# Patient Record
Sex: MALE | Race: WHITE | Employment: OTHER | URBAN - METROPOLITAN AREA
[De-identification: names, ages, dates, MRNs, and addresses within clinical notes are randomized per-mention and may not be internally consistent; named-entity substitution may affect disease eponyms.]

---

## 2022-12-01 ENCOUNTER — HOSPITAL ENCOUNTER (EMERGENCY)
Age: 67
Discharge: HOME | End: 2022-12-01
Attending: EMERGENCY MEDICINE
Payer: MEDICARE

## 2022-12-01 ENCOUNTER — APPOINTMENT (OUTPATIENT)
Dept: GENERAL RADIOLOGY | Age: 67
End: 2022-12-01
Payer: MEDICARE

## 2022-12-01 VITALS
DIASTOLIC BLOOD PRESSURE: 94 MMHG | TEMPERATURE: 97.7 F | SYSTOLIC BLOOD PRESSURE: 144 MMHG | OXYGEN SATURATION: 93 % | HEART RATE: 70 BPM | RESPIRATION RATE: 16 BRPM

## 2022-12-01 DIAGNOSIS — R05.9 COUGH, UNSPECIFIED TYPE: ICD-10-CM

## 2022-12-01 DIAGNOSIS — J06.9 UPPER RESPIRATORY TRACT INFECTION, UNSPECIFIED TYPE: ICD-10-CM

## 2022-12-01 DIAGNOSIS — J10.1 INFLUENZA A: Primary | ICD-10-CM

## 2022-12-01 DIAGNOSIS — R68.89 FLU-LIKE SYMPTOMS: ICD-10-CM

## 2022-12-01 DIAGNOSIS — J18.9 PNEUMONIA DUE TO INFECTIOUS ORGANISM, UNSPECIFIED LATERALITY, UNSPECIFIED PART OF LUNG: ICD-10-CM

## 2022-12-01 LAB
RAPID INFLUENZA  B AGN: NEGATIVE
RAPID INFLUENZA A AGN: POSITIVE
SARS-COV-2, NAAT: NOT DETECTED
SOURCE: NORMAL

## 2022-12-01 PROCEDURE — 87804 INFLUENZA ASSAY W/OPTIC: CPT

## 2022-12-01 PROCEDURE — 87635 SARS-COV-2 COVID-19 AMP PRB: CPT

## 2022-12-01 PROCEDURE — 6370000000 HC RX 637 (ALT 250 FOR IP): Performed by: EMERGENCY MEDICINE

## 2022-12-01 PROCEDURE — 71045 X-RAY EXAM CHEST 1 VIEW: CPT

## 2022-12-01 PROCEDURE — 99284 EMERGENCY DEPT VISIT MOD MDM: CPT

## 2022-12-01 RX ORDER — ASPIRIN 81 MG/1
81 TABLET ORAL WEEKLY
COMMUNITY

## 2022-12-01 RX ORDER — AZITHROMYCIN 250 MG/1
TABLET, FILM COATED ORAL
Qty: 1 PACKET | Refills: 0 | Status: SHIPPED | OUTPATIENT
Start: 2022-12-01 | End: 2022-12-11

## 2022-12-01 RX ORDER — NAPROXEN 500 MG/1
500 TABLET ORAL 2 TIMES DAILY
Qty: 60 TABLET | Refills: 0 | Status: SHIPPED | OUTPATIENT
Start: 2022-12-01

## 2022-12-01 RX ORDER — TAMSULOSIN HYDROCHLORIDE 0.4 MG/1
CAPSULE ORAL
COMMUNITY
Start: 2022-09-15

## 2022-12-01 RX ORDER — ACETAMINOPHEN 325 MG/1
650 TABLET ORAL EVERY 6 HOURS PRN
Qty: 120 TABLET | Refills: 3 | Status: SHIPPED | OUTPATIENT
Start: 2022-12-01

## 2022-12-01 RX ORDER — ALBUTEROL SULFATE 90 UG/1
2 AEROSOL, METERED RESPIRATORY (INHALATION) ONCE
Status: COMPLETED | OUTPATIENT
Start: 2022-12-01 | End: 2022-12-01

## 2022-12-01 RX ORDER — GUAIFENESIN/DEXTROMETHORPHAN 100-10MG/5
5 SYRUP ORAL 4 TIMES DAILY PRN
Qty: 120 ML | Refills: 0 | Status: SHIPPED | OUTPATIENT
Start: 2022-12-01 | End: 2022-12-11

## 2022-12-01 RX ORDER — BENZONATATE 100 MG/1
100 CAPSULE ORAL 3 TIMES DAILY PRN
Qty: 10 CAPSULE | Refills: 0 | Status: SHIPPED | OUTPATIENT
Start: 2022-12-01 | End: 2022-12-08

## 2022-12-01 RX ORDER — BENZONATATE 100 MG/1
100 CAPSULE ORAL ONCE
Status: COMPLETED | OUTPATIENT
Start: 2022-12-01 | End: 2022-12-01

## 2022-12-01 RX ORDER — FINASTERIDE 5 MG/1
TABLET, FILM COATED ORAL
COMMUNITY
Start: 2022-10-12

## 2022-12-01 RX ORDER — AZILSARTAN KAMEDOXOMIL AND CHLORTHALIDONE 40; 25 MG/1; MG/1
TABLET ORAL
COMMUNITY
Start: 2022-10-23

## 2022-12-01 RX ORDER — NEBIVOLOL 20 MG/1
TABLET ORAL
COMMUNITY
Start: 2022-09-27

## 2022-12-01 RX ORDER — ESOMEPRAZOLE MAGNESIUM 40 MG/1
CAPSULE, DELAYED RELEASE ORAL
COMMUNITY
Start: 2022-10-05

## 2022-12-01 RX ORDER — SILDENAFIL 100 MG/1
100 TABLET, FILM COATED ORAL PRN
COMMUNITY
Start: 2013-03-20

## 2022-12-01 RX ORDER — LEUCOVORIN/PYRIDOX/MECOBALAMIN 4-50-2 MG
TABLET ORAL
COMMUNITY
Start: 2022-10-04

## 2022-12-01 RX ORDER — PANTOPRAZOLE SODIUM 40 MG/1
TABLET, DELAYED RELEASE ORAL
COMMUNITY
Start: 2022-10-23

## 2022-12-01 RX ORDER — ALBUTEROL SULFATE 90 UG/1
2 AEROSOL, METERED RESPIRATORY (INHALATION) EVERY 4 HOURS PRN
Qty: 18 G | Refills: 1 | Status: SHIPPED | OUTPATIENT
Start: 2022-12-01 | End: 2022-12-31

## 2022-12-01 RX ADMIN — GUAIFENESIN 200 MG: 200 SOLUTION ORAL at 13:36

## 2022-12-01 RX ADMIN — BENZONATATE 100 MG: 100 CAPSULE ORAL at 13:36

## 2022-12-01 RX ADMIN — ALBUTEROL SULFATE 2 PUFF: 90 AEROSOL, METERED RESPIRATORY (INHALATION) at 13:37

## 2022-12-01 ASSESSMENT — ENCOUNTER SYMPTOMS
WHEEZING: 1
SHORTNESS OF BREATH: 1
DIARRHEA: 1
COUGH: 1
EYES NEGATIVE: 1
ALLERGIC/IMMUNOLOGIC NEGATIVE: 1

## 2022-12-01 ASSESSMENT — PAIN - FUNCTIONAL ASSESSMENT: PAIN_FUNCTIONAL_ASSESSMENT: NONE - DENIES PAIN

## 2022-12-01 NOTE — ED PROVIDER NOTES
[address] [facility] [place]      TRIAGE CHIEF COMPLAINT:   Cough (Cough and congestion, chills since Sunday. Pt ambulated to bed per self, AOx4, breathing unlabored, skin warm and dry. Pt taking OTC meds for symptoms. Pt also reports loose stools at time. )      [place]:  Kymberly Hernandez is a 69 y.o. male that presents with complaint of cough flulike symptoms since Sunday. Patient states he was around people for Thanksgiving and ever since then has gotten sick cough congestion rhinorrhea body aches chills malaise fatigue subjective fevers. No headache just has chest pain when coughing also has diarrhea. Denies any urine complaints no other questions or concerns. Beth Balling REVIEW OF SYSTEMS:  At least 10 systems reviewed and otherwise acutely negative except as in the [address] Review of Systems   Constitutional:  Positive for chills. HENT:  Positive for congestion. Eyes: Negative. Respiratory:  Positive for cough, shortness of breath and wheezing. Cardiovascular: Negative. Gastrointestinal:  Positive for diarrhea. Endocrine: Negative. Genitourinary: Negative. Musculoskeletal:  Positive for arthralgias and myalgias. Skin: Negative. Allergic/Immunologic: Negative. Neurological: Negative. Hematological: Negative. Psychiatric/Behavioral: Negative. All other systems reviewed and are negative. Past Medical History:   Diagnosis Date    Diabetes mellitus ([address]     Hypertension      Past Surgical History:   Procedure Laterality Date    BACK SURGERY      NECK MASS EXCISION      SKIN CANCER EXCISION       History reviewed. No pertinent family history.   Social History     Socioeconomic History    Marital status:      Spouse name: Not on file    Number of children: Not on file    Years of education: Not on file    Highest education level: Not on file   Occupational History    Not on file   Tobacco Use    Smoking status: Not on file    Smokeless tobacco: Not on file   Vaping Use    Vaping Use: Never used   Substance and Sexual Activity    Alcohol use: Yes    Drug use: Never    Sexual activity: Not on file   Other Topics Concern    Not on file   Social History Narrative    Not on file     Social Determinants of Health     Financial Resource Strain: Not on file   Food Insecurity: Not on file   Transportation Needs: Not on file   Physical Activity: Not on file   Stress: Not on file   Social Connections: Not on file   Intimate Partner Violence: Not on file   Housing Stability: Not on file     No current facility-administered medications for this encounter. Current Outpatient Medications   Medication Sig Dispense Refill    sildenafil (VIAGRA) 100 MG tablet Take 100 mg by mouth as needed      guaiFENesin-dextromethorphan (ROBITUSSIN DM) 100-10 MG/5ML syrup Take 5 mLs by mouth 4 times daily as needed for Cough 120 mL 0    benzonatate (TESSALON PERLES) 100 MG capsule Take 1 capsule by mouth 3 times daily as needed for Cough 10 capsule 0    albuterol sulfate HFA (PROVENTIL HFA) 108 (90 Base) MCG/ACT inhaler Inhale 2 puffs into the lungs every 4 hours as needed for Wheezing or Shortness of Breath With spacer (and mask if indicated). Thanks.  18 g 1    naproxen (NAPROSYN) 500 MG tablet Take 1 tablet by mouth 2 times daily 60 tablet 0    acetaminophen (TYLENOL) 325 MG tablet Take 2 tablets by mouth every 6 hours as needed for Pain 120 tablet 3    azithromycin (ZITHROMAX) 250 MG tablet Take 2 tablets (500 mg) on Day 1, followed by 1 tablet (250 mg) once daily on Days 2 through 5. 1 packet 0    aspirin 81 MG EC tablet Take 81 mg by mouth once a week      EDARBYCLOR 40-25 MG TABS take 1 tablet by mouth every morning      esomeprazole (NEXIUM) 40 MG delayed release capsule take 1 tablet by mouth once daily      finasteride (PROSCAR) 5 MG tablet take 1 tablet by mouth once daily      FOLINIC-PLUS 4-50-2 MG TABS take 1 tablet by mouth once daily      metFORMIN (GLUCOPHAGE) 850 MG tablet take 1 tablet by mouth twice a day      nebivolol (BYSTOLIC) 20 MG TABS tablet take 1 tablet by mouth once daily      pantoprazole (PROTONIX) 40 MG tablet take 1 tablet by mouth every evening      tamsulosin (FLOMAX) 0.4 MG capsule take 1 capsule by mouth at bedtime        No Known Allergies  No current facility-administered medications for this encounter. Current Outpatient Medications   Medication Sig Dispense Refill    sildenafil (VIAGRA) 100 MG tablet Take 100 mg by mouth as needed      guaiFENesin-dextromethorphan (ROBITUSSIN DM) 100-10 MG/5ML syrup Take 5 mLs by mouth 4 times daily as needed for Cough 120 mL 0    benzonatate (TESSALON PERLES) 100 MG capsule Take 1 capsule by mouth 3 times daily as needed for Cough 10 capsule 0    albuterol sulfate HFA (PROVENTIL HFA) 108 (90 Base) MCG/ACT inhaler Inhale 2 puffs into the lungs every 4 hours as needed for Wheezing or Shortness of Breath With spacer (and mask if indicated). Thanks.  18 g 1    naproxen (NAPROSYN) 500 MG tablet Take 1 tablet by mouth 2 times daily 60 tablet 0    acetaminophen (TYLENOL) 325 MG tablet Take 2 tablets by mouth every 6 hours as needed for Pain 120 tablet 3    azithromycin (ZITHROMAX) 250 MG tablet Take 2 tablets (500 mg) on Day 1, followed by 1 tablet (250 mg) once daily on Days 2 through 5. 1 packet 0    aspirin 81 MG EC tablet Take 81 mg by mouth once a week      EDARBYCLOR 40-25 MG TABS take 1 tablet by mouth every morning      esomeprazole (NEXIUM) 40 MG delayed release capsule take 1 tablet by mouth once daily      finasteride (PROSCAR) 5 MG tablet take 1 tablet by mouth once daily      FOLINIC-PLUS 4-50-2 MG TABS take 1 tablet by mouth once daily      metFORMIN (GLUCOPHAGE) 850 MG tablet take 1 tablet by mouth twice a day      nebivolol (BYSTOLIC) 20 MG TABS tablet take 1 tablet by mouth once daily      pantoprazole (PROTONIX) 40 MG tablet take 1 tablet by mouth every evening      tamsulosin (FLOMAX) 0.4 MG capsule take 1 capsule by mouth at bedtime         Nursing Notes Reviewed    VITAL SIGNS:  ED Triage Vitals   Enc Vitals Group      BP       Pulse       Resp       Temp       Temp src       SpO2       Weight       Height       Head Circumference       Peak Flow       Pain Score       Pain Loc       Pain Edu? Excl. in [address]? PHYSICAL EXAM:  Physical Exam  Vitals and nursing note reviewed. Constitutional:       General: He is not in acute distress. Appearance: Normal appearance. He is not ill-appearing, toxic-appearing or diaphoretic. HENT:      Head: Normocephalic and atraumatic. Right Ear: External ear normal.      Left Ear: External ear normal.      Nose: Congestion present. Mouth/Throat:      Mouth: Mucous membranes are moist.      Pharynx: Oropharynx is clear. No oropharyngeal exudate or posterior oropharyngeal erythema. Eyes:      General: No scleral icterus. Right eye: No discharge. Left eye: No discharge. Extraocular Movements: Extraocular movements intact. Conjunctiva/sclera: Conjunctivae normal.   Cardiovascular:      Rate and Rhythm: Normal rate and regular rhythm. Pulses: Normal pulses. Heart sounds: Normal heart sounds. Pulmonary:      Effort: Pulmonary effort is normal. No respiratory distress. Breath sounds: No stridor. Wheezing and rhonchi present. No rales. Chest:      Chest wall: Tenderness present. Abdominal:      General: Bowel sounds are normal. There is no distension. Palpations: Abdomen is soft. There is no mass. Tenderness: There is no abdominal tenderness. There is no guarding or rebound. Hernia: No hernia is present. Musculoskeletal:         General: Tenderness present. No swelling, deformity or signs of injury. Normal range of motion. Cervical back: Normal range of motion. No rigidity. Right lower leg: No edema. Left lower leg: No edema. Skin:     General: Skin is warm. Coloration: Skin is not jaundiced or pale. Findings: No bruising, erythema, lesion or rash. Neurological:      General: No focal deficit present. Mental Status: He is alert and oriented to person, place, and time. Cranial Nerves: No cranial nerve deficit. Sensory: No sensory deficit. Motor: No weakness. Coordination: Coordination normal.   Psychiatric:         Mood and Affect: Mood normal.         Behavior: Behavior normal.         Thought Content: Thought content normal.         Judgment: Judgment normal.         I have reviewed andinterpreted all of the currently available lab results from this visit (if applicable):    Results for orders placed or performed during the hospital encounter of 12/01/22   Rapid Flu Swab    Specimen: Nasopharyngeal   Result Value Ref Range    Rapid Influenza A Ag POSITIVE (A) NEGATIVE    Rapid Influenza B Ag NEGATIVE NEGATIVE   COVID-19, Rapid    Specimen: Nasopharyngeal   Result Value Ref Range    Source NARES     SARS-CoV-2, NAAT NOT DETECTED NOT DETECTED        Radiographs (if obtained):  [] The following radiograph was interpreted by myself in the absence of a radiologist:  [x] Radiologist's Report Reviewed:    Chest x-ray    EKG (if obtained): (All EKG's are interpreted by myself in the absence of a cardiologist)    MDM:    Patient here with flulike symptoms. Again patient's been sick since Sunday. He was around people Thanksgiving now he is got cough congestion fatigue malaise body ache subjective fevers and chills congestion. Chest pain with coughing and nausea diarrhea. On exam he is coughing has some wheezing and rhonchi. Otherwise he is no distress vital signs are stable. I will give him cough medicine, inhaler, do a chest x-ray rapid flu, COVID. He tested positive for influenza. Has been vaccinated for COVID. Patient is outside window for Tamiflu.   Will discharge home with supportive care medications and again symptoms viral, influenza nature I do not think he needs additional labs or imaging at all think he needs admission for EKG he appears otherwise nontoxic nonseptic no distress. Patient recheck doing well. Tested positive for the flu. X-ray shows possible infiltrate versus atelectasis. Cough has been productive we will give him a Z-Jagjit in case is bacterial otherwise likely viral, flu. Patient stable discharge home given return precautions and follow-up information. Stable. CLINICAL IMPRESSION:  Final diagnoses:   Upper respiratory tract infection, unspecified type   Flu-like symptoms   Cough, unspecified type   Influenza A   Pneumonia due to infectious organism, unspecified laterality, unspecified part of lung       (Please note that portions of this note may have been completed with a voice recognition program. Efforts were made to edit the dictations but occasionally words aremis-transcribed.)    DISPOSITION REFERRAL (if applicable):  [address]  [phone]    If symptoms worsen    [place], [place]  [address]  [phone]    Schedule an appointment as soon as possible for a visit in 1 day  Family doctor    DISPOSITION MEDICATIONS (if applicable):  New Prescriptions    ACETAMINOPHEN (TYLENOL) 325 MG TABLET    Take 2 tablets by mouth every 6 hours as needed for Pain    ALBUTEROL SULFATE HFA (PROVENTIL HFA) 108 (90 BASE) MCG/ACT INHALER    Inhale 2 puffs into the lungs every 4 hours as needed for Wheezing or Shortness of Breath With spacer (and mask if indicated). Thanks. AZITHROMYCIN (ZITHROMAX) 250 MG TABLET    Take 2 tablets (500 mg) on Day 1, followed by 1 tablet (250 mg) once daily on Days 2 through 5.     BENZONATATE (TESSALON PERLES) 100 MG CAPSULE    Take 1 capsule by mouth 3 times daily as needed for Cough    GUAIFENESIN-DEXTROMETHORPHAN (ROBITUSSIN DM) 100-10 MG/5ML SYRUP    Take 5 mLs by mouth 4 times daily as needed for Cough    NAPROXEN (NAPROSYN) 500 MG TABLET    Take 1 tablet by mouth 2 times daily          Mercy Elise, DO Mercy Elise DO  12/01/22 [id number]

## 2022-12-01 NOTE — Clinical Note
Iza Engel was seen and treated in our emergency department on 12/1/2022. He may return to work on 12/05/2022. If you have any questions or concerns, please don't hesitate to call.       Mercy Cones, DO

## 2023-01-12 ENCOUNTER — APPOINTMENT (OUTPATIENT)
Dept: CT IMAGING | Age: 68
End: 2023-01-12
Payer: MEDICARE

## 2023-01-12 ENCOUNTER — HOSPITAL ENCOUNTER (EMERGENCY)
Age: 68
Discharge: ANOTHER HOSPITAL | End: 2023-01-13
Attending: EMERGENCY MEDICINE
Payer: MEDICARE

## 2023-01-12 ENCOUNTER — APPOINTMENT (OUTPATIENT)
Dept: GENERAL RADIOLOGY | Age: 68
End: 2023-01-12
Payer: MEDICARE

## 2023-01-12 DIAGNOSIS — R55 SYNCOPE, UNSPECIFIED SYNCOPE TYPE: Primary | ICD-10-CM

## 2023-01-12 DIAGNOSIS — I61.9 INTRAPARENCHYMAL HEMORRHAGE OF BRAIN (HCC): ICD-10-CM

## 2023-01-12 DIAGNOSIS — S06.5XAA SUBDURAL HEMATOMA: ICD-10-CM

## 2023-01-12 DIAGNOSIS — E87.1 HYPONATREMIA: ICD-10-CM

## 2023-01-12 LAB
ALBUMIN SERPL-MCNC: 4.1 GM/DL (ref 3.4–5)
ALCOHOL SCREEN SERUM: 0.05 %WT/VOL
ALP BLD-CCNC: 49 IU/L (ref 40–129)
ALT SERPL-CCNC: 31 U/L (ref 10–40)
ANION GAP SERPL CALCULATED.3IONS-SCNC: 15 MMOL/L (ref 4–16)
AST SERPL-CCNC: 23 IU/L (ref 15–37)
BASOPHILS ABSOLUTE: 0 K/CU MM
BASOPHILS RELATIVE PERCENT: 0.1 % (ref 0–1)
BILIRUB SERPL-MCNC: 0.4 MG/DL (ref 0–1)
BILIRUBIN URINE: NEGATIVE MG/DL
BLOOD, URINE: NEGATIVE
BUN BLDV-MCNC: 16 MG/DL (ref 6–23)
CALCIUM SERPL-MCNC: 8.6 MG/DL (ref 8.3–10.6)
CHLORIDE BLD-SCNC: 85 MMOL/L (ref 99–110)
CLARITY: CLEAR
CO2: 23 MMOL/L (ref 21–32)
COLOR: YELLOW
COMMENT UA: NORMAL
CREAT SERPL-MCNC: 0.9 MG/DL (ref 0.9–1.3)
DIFFERENTIAL TYPE: ABNORMAL
EOSINOPHILS ABSOLUTE: 0 K/CU MM
EOSINOPHILS RELATIVE PERCENT: 0.2 % (ref 0–3)
GFR SERPL CREATININE-BSD FRML MDRD: >60 ML/MIN/1.73M2
GLUCOSE BLD-MCNC: 155 MG/DL (ref 70–99)
GLUCOSE BLD-MCNC: 157 MG/DL (ref 70–99)
GLUCOSE, URINE: NEGATIVE MG/DL
HCT VFR BLD CALC: 37 % (ref 42–52)
HEMOGLOBIN: 13.1 GM/DL (ref 13.5–18)
IMMATURE NEUTROPHIL %: 0.5 % (ref 0–0.43)
KETONES, URINE: NEGATIVE MG/DL
LEUKOCYTE ESTERASE, URINE: NEGATIVE
LYMPHOCYTES ABSOLUTE: 1.5 K/CU MM
LYMPHOCYTES RELATIVE PERCENT: 17.2 % (ref 24–44)
MCH RBC QN AUTO: 32.8 PG (ref 27–31)
MCHC RBC AUTO-ENTMCNC: 35.4 % (ref 32–36)
MCV RBC AUTO: 92.5 FL (ref 78–100)
MONOCYTES ABSOLUTE: 0.6 K/CU MM
MONOCYTES RELATIVE PERCENT: 6.9 % (ref 0–4)
NITRITE URINE, QUANTITATIVE: NEGATIVE
PDW BLD-RTO: 11.9 % (ref 11.7–14.9)
PH, URINE: 5.5 (ref 5–8)
PLATELET # BLD: 272 K/CU MM (ref 140–440)
PMV BLD AUTO: 9.6 FL (ref 7.5–11.1)
POTASSIUM SERPL-SCNC: 3.7 MMOL/L (ref 3.5–5.1)
PROTEIN UA: NEGATIVE MG/DL
RBC # BLD: 4 M/CU MM (ref 4.6–6.2)
SEGMENTED NEUTROPHILS ABSOLUTE COUNT: 6.6 K/CU MM
SEGMENTED NEUTROPHILS RELATIVE PERCENT: 75.1 % (ref 36–66)
SODIUM BLD-SCNC: 123 MMOL/L (ref 135–145)
SPECIFIC GRAVITY UA: 1.01 (ref 1–1.03)
TOTAL IMMATURE NEUTOROPHIL: 0.04 K/CU MM
TOTAL PROTEIN: 7 GM/DL (ref 6.4–8.2)
TROPONIN T: <0.01 NG/ML
UROBILINOGEN, URINE: 0.2 MG/DL (ref 0.2–1)
WBC # BLD: 8.7 K/CU MM (ref 4–10.5)

## 2023-01-12 PROCEDURE — 84300 ASSAY OF URINE SODIUM: CPT

## 2023-01-12 PROCEDURE — 82962 GLUCOSE BLOOD TEST: CPT

## 2023-01-12 PROCEDURE — 83935 ASSAY OF URINE OSMOLALITY: CPT

## 2023-01-12 PROCEDURE — 96360 HYDRATION IV INFUSION INIT: CPT

## 2023-01-12 PROCEDURE — G0480 DRUG TEST DEF 1-7 CLASSES: HCPCS

## 2023-01-12 PROCEDURE — 72125 CT NECK SPINE W/O DYE: CPT

## 2023-01-12 PROCEDURE — 85025 COMPLETE CBC W/AUTO DIFF WBC: CPT

## 2023-01-12 PROCEDURE — 70450 CT HEAD/BRAIN W/O DYE: CPT

## 2023-01-12 PROCEDURE — 80053 COMPREHEN METABOLIC PANEL: CPT

## 2023-01-12 PROCEDURE — 71045 X-RAY EXAM CHEST 1 VIEW: CPT

## 2023-01-12 PROCEDURE — 84484 ASSAY OF TROPONIN QUANT: CPT

## 2023-01-12 PROCEDURE — 85610 PROTHROMBIN TIME: CPT

## 2023-01-12 PROCEDURE — 99285 EMERGENCY DEPT VISIT HI MDM: CPT

## 2023-01-12 PROCEDURE — 82570 ASSAY OF URINE CREATININE: CPT

## 2023-01-12 PROCEDURE — 93005 ELECTROCARDIOGRAM TRACING: CPT | Performed by: EMERGENCY MEDICINE

## 2023-01-12 PROCEDURE — 81003 URINALYSIS AUTO W/O SCOPE: CPT

## 2023-01-12 ASSESSMENT — PAIN - FUNCTIONAL ASSESSMENT
PAIN_FUNCTIONAL_ASSESSMENT: NONE - DENIES PAIN
PAIN_FUNCTIONAL_ASSESSMENT: NONE - DENIES PAIN

## 2023-01-13 VITALS
SYSTOLIC BLOOD PRESSURE: 108 MMHG | OXYGEN SATURATION: 98 % | TEMPERATURE: 98.2 F | WEIGHT: 211 LBS | HEART RATE: 72 BPM | HEIGHT: 76 IN | RESPIRATION RATE: 20 BRPM | DIASTOLIC BLOOD PRESSURE: 57 MMHG | BODY MASS INDEX: 25.69 KG/M2

## 2023-01-13 LAB
CREATININE URINE: 36.8 MG/DL (ref 39–259)
EKG ATRIAL RATE: 75 BPM
EKG DIAGNOSIS: NORMAL
EKG P AXIS: 36 DEGREES
EKG P-R INTERVAL: 152 MS
EKG Q-T INTERVAL: 374 MS
EKG QRS DURATION: 102 MS
EKG QTC CALCULATION (BAZETT): 417 MS
EKG R AXIS: 12 DEGREES
EKG T AXIS: 49 DEGREES
EKG VENTRICULAR RATE: 75 BPM
INR BLD: 0.92 INDEX
OSMOLALITY URINE: 293 MOS/L (ref 292–1090)
PROTHROMBIN TIME: 12.1 SECONDS (ref 11.7–14.5)
SODIUM URINE: 54 MMOL/L (ref 35–167)

## 2023-01-13 PROCEDURE — 2580000003 HC RX 258: Performed by: EMERGENCY MEDICINE

## 2023-01-13 PROCEDURE — 96360 HYDRATION IV INFUSION INIT: CPT

## 2023-01-13 RX ORDER — SODIUM CHLORIDE 9 MG/ML
INJECTION, SOLUTION INTRAVENOUS CONTINUOUS
Status: DISCONTINUED | OUTPATIENT
Start: 2023-01-13 | End: 2023-01-13 | Stop reason: HOSPADM

## 2023-01-13 RX ADMIN — SODIUM CHLORIDE: 9 INJECTION, SOLUTION INTRAVENOUS at 00:21

## 2023-01-13 ASSESSMENT — PAIN - FUNCTIONAL ASSESSMENT
PAIN_FUNCTIONAL_ASSESSMENT: NONE - DENIES PAIN
PAIN_FUNCTIONAL_ASSESSMENT: NONE - DENIES PAIN

## 2023-01-13 NOTE — ED PROVIDER NOTES
[address] [facility] ENCOUNTER      Pt Name: Juan Alberto Lucero  MRN: [id number]  Loiuegfarmida 1955  Date of evaluation: 1/12/2023  Provider: Matthew Munson MD    CHIEF COMPLAINT       Chief Complaint   Patient presents with    Loss of Consciousness     Pt wife reports pt stood to get out of bed and had a sudden loss of consciousness and fell hitting his head. Pt is A&O x4 and denies any pain at this time. HISTORY OF PRESENT ILLNESS      Juan Alberto Lucero is a 69 y.o. male who presents to the emergency department  for   Chief Complaint   Patient presents with    Loss of Consciousness     Pt wife reports pt stood to get out of bed and had a sudden loss of consciousness and fell hitting his head. Pt is A&O x4 and denies any pain at this time. 68-year male with history of CAD, DM, hypertension presents after syncopal fall at home. He presents with his wife who provides collateral information. He reportedly was sitting down watching television and his wife believes he got up to take a cup to the kitchen when she heard him fall. She reports finding him \"passed out. \"  He did take a couple of minutes to return to his baseline. He has ambulated since the fall. He is amnestic about the fall. His wife denies that he had any convulsions that she witnessed. She does endorse that he has a remote history of passing out. She reports that his prior episodes of passing out were due to blood sugar issues. She reports that she did take his blood sugar after the episode and it was in the 130s. She reports she initially had a hard time getting a blood pressure reading on him. He comes the emergency department for evaluation. He is now at his neurological baseline. Presents with a GCS of 15. He is amatory without difficulty. He denies any pain complaints. He denies any chest pain or abdominal pain. Denies any palpitations. He is not anticoagulated.   His wife reports that he did strike his head when he fell. He does report that he is getting over COVID-19 infection. He was on multiple medications including steroids and some antibiotics for Covid-19. His wife reports that he did test negative for Covid-19 today. [place] He reports drinking 5 to 6 beers a day. Nursing Notes, Triage Notes & Vital Signs were reviewed. REVIEW OF SYSTEMS    (2-9 systems for level 4, 10 or more for level 5)     Review of Systems   Neurological:  Positive for syncope. Except as noted above the remainder of the review of systems was reviewed and negative. PAST MEDICAL HISTORY     Past Medical History:   Diagnosis Date    Diabetes mellitus ([facility] [address]     Hypertension        Prior to Admission medications    Medication Sig Start Date End Date Taking?  Authorizing Provider   aspirin 81 MG EC tablet Take 81 mg by mouth once a week    Historical Provider, MD   EDARBYCLOR 40-25 MG TABS take 1 tablet by mouth every morning 10/23/22   Historical Provider, MD   esomeprazole (NEXIUM) 40 MG delayed release capsule take 1 tablet by mouth once daily 10/5/22   Historical Provider, MD   finasteride (PROSCAR) 5 MG tablet take 1 tablet by mouth once daily 10/12/22   Historical Provider, MD   FOLINIC-PLUS 4-50-2 MG TABS take 1 tablet by mouth once daily 10/4/22   Historical Provider, MD   metFORMIN (GLUCOPHAGE) 850 MG tablet take 1 tablet by mouth twice a day 10/23/22   Historical Provider, MD   nebivolol (BYSTOLIC) 20 MG TABS tablet take 1 tablet by mouth once daily 9/27/22   Historical Provider, MD   pantoprazole (PROTONIX) 40 MG tablet take 1 tablet by mouth every evening 10/23/22   Historical Provider, MD   sildenafil (VIAGRA) 100 MG tablet Take 100 mg by mouth as needed 3/20/13   Historical Provider, MD   tamsulosin (FLOMAX) 0.4 MG capsule take 1 capsule by mouth at bedtime 9/15/22   Historical Provider, MD   albuterol sulfate HFA (PROVENTIL HFA) 108 (90 Base) MCG/ACT inhaler Inhale 2 puffs into the lungs every 4 hours as needed for Wheezing or Shortness of Breath With spacer (and mask if indicated). Thanks. 12/1/22 12/31/22  Nae Allen, DO   naproxen (NAPROSYN) 500 MG tablet Take 1 tablet by mouth 2 times daily 12/1/22   Nae Allen, DO   acetaminophen (TYLENOL) 325 MG tablet Take 2 tablets by mouth every 6 hours as needed for Pain 12/1/22   Nae Allen, DO        There is no problem list on file for this patient. SURGICAL HISTORY       Past Surgical History:   Procedure Laterality Date    BACK SURGERY      NECK MASS EXCISION      SKIN CANCER EXCISION           CURRENT MEDICATIONS       Previous Medications    ACETAMINOPHEN (TYLENOL) 325 MG TABLET    Take 2 tablets by mouth every 6 hours as needed for Pain    ALBUTEROL SULFATE HFA (PROVENTIL HFA) 108 (90 BASE) MCG/ACT INHALER    Inhale 2 puffs into the lungs every 4 hours as needed for Wheezing or Shortness of Breath With spacer (and mask if indicated). Thanks. ASPIRIN 81 MG EC TABLET    Take 81 mg by mouth once a week    EDARBYCLOR 40-25 MG TABS    take 1 tablet by mouth every morning    ESOMEPRAZOLE (NEXIUM) 40 MG DELAYED RELEASE CAPSULE    take 1 tablet by mouth once daily    FINASTERIDE (PROSCAR) 5 MG TABLET    take 1 tablet by mouth once daily    FOLINIC-PLUS 4-50-2 MG TABS    take 1 tablet by mouth once daily    METFORMIN (GLUCOPHAGE) 850 MG TABLET    take 1 tablet by mouth twice a day    NAPROXEN (NAPROSYN) 500 MG TABLET    Take 1 tablet by mouth 2 times daily    NEBIVOLOL (BYSTOLIC) 20 MG TABS TABLET    take 1 tablet by mouth once daily    PANTOPRAZOLE (PROTONIX) 40 MG TABLET    take 1 tablet by mouth every evening    SILDENAFIL (VIAGRA) 100 MG TABLET    Take 100 mg by mouth as needed    TAMSULOSIN (FLOMAX) 0.4 MG CAPSULE    take 1 capsule by mouth at bedtime       ALLERGIES     Penicillins    FAMILY HISTORY     History reviewed. No pertinent family history.        SOCIAL HISTORY       Social History     Socioeconomic History    Marital status:      Spouse name: None    Number of children: None    Years of education: None    Highest education level: None   Tobacco Use    Smoking status: Former     Types: Cigarettes    Smokeless tobacco: Never   Vaping Use    Vaping Use: Never used   Substance and Sexual Activity    Alcohol use: Yes     Comment: \"5-6 beers a day\"    Drug use: Never       SCREENINGS    Roque Coma Scale  Eye Opening: Spontaneous  Best Verbal Response: Oriented  Best Motor Response: Obeys commands  Roque Coma Scale Score: 15          PHYSICAL EXAM    (up to 7 for level 4, 8 or more for level 5)     ED Triage Vitals   BP Temp Temp src Pulse Resp SpO2 Height Weight   -- -- -- -- -- -- -- --       Physical Exam  Vitals reviewed. Constitutional:       Appearance: He is not ill-appearing or toxic-appearing. HENT:      Head: Normocephalic and atraumatic. Nose: Nose normal.      Mouth/Throat:      Mouth: Mucous membranes are moist.      Pharynx: No oropharyngeal exudate or posterior oropharyngeal erythema. Eyes:      General:         Right eye: No discharge. Left eye: No discharge. Extraocular Movements: Extraocular movements intact. Pupils: Pupils are equal, round, and reactive to light. Cardiovascular:      Rate and Rhythm: Normal rate. Pulses: Normal pulses. Heart sounds: No friction rub. No gallop. Comments: Equal pulses in b/l upper extremities  Pulmonary:      Effort: Pulmonary effort is normal.   Abdominal:      Palpations: Abdomen is soft. Tenderness: There is no abdominal tenderness. There is no guarding. Musculoskeletal:         General: No swelling or tenderness. Normal range of motion. Cervical back: Normal range of motion and neck supple. Skin:     General: Skin is warm. Capillary Refill: Capillary refill takes less than 2 seconds. Neurological:      General: No focal deficit present. Mental Status: He is alert.        DIAGNOSTIC RESULTS Labs Reviewed   COMPREHENSIVE METABOLIC PANEL - Abnormal; Notable for the following components:       Result Value    Sodium 123 (*)     Chloride 85 (*)     Glucose 157 (*)     All other components within normal limits   CBC WITH AUTO DIFFERENTIAL - Abnormal; Notable for the following components:    RBC 4.00 (*)     Hemoglobin 13.1 (*)     Hematocrit 37.0 (*)     MCH 32.8 (*)     Segs Relative 75.1 (*)     Lymphocytes % 17.2 (*)     Monocytes % 6.9 (*)     Immature Neutrophil % 0.5 (*)     All other components within normal limits   ETHANOL - Abnormal; Notable for the following components:    Alcohol Scrn 0.05 (*)     All other components within normal limits   POCT GLUCOSE - Abnormal; Notable for the following components:    POC Glucose 155 (*)     All other components within normal limits   TROPONIN   URINALYSIS   PROTIME-INR   SODIUM, URINE, RANDOM   CREATININE, RANDOM URINE   OSMOLALITY, URINE          EKG: All EKG's are interpreted by the Emergency Department Physician who either signs or Co-signs this chart in the absence of a cardiologist.       EKG Interpretation    Interpreted by emergency department physician    EKG is interpreted by me. EKG shows sinus rhythm at 75 bpm, axis is nondeviated, no remarkable ST segment elevations or depressions, T waves are unremarkable, IN interval 152, QRS duration of 102, QTC of 417. Final impression, nonspecific EKG. Lisbeth Nyhan, MD     RADIOLOGY:     Non-plain film images such as CT, Ultrasound and MRI are read by the radiologist. Plain radiographic images are visualized and preliminarily interpreted by the emergency physician. Interpretation per the Radiologist below, if available at the time of this note:    CT CERVICAL SPINE WO CONTRAST   Final Result   No acute abnormality of the cervical spine. CT HEAD WO CONTRAST   Preliminary Result   1. Right frontal intraparenchymal hemorrhage measuring 19 x 15 x 8 mm with   minimal adjacent edema. 2. Trace parafalcine subdural hematoma measures up to 1.2 mm. Findings were communicated to Dr. Kelli Grimm on 01/12/2023 at 11:39 p.m. XR CHEST PORTABLE   Final Result   1. No acute cardiopulmonary disease. ED BEDSIDE ULTRASOUND:   Performed by ED Physician Bryant Fontenot MD       LABS:  Labs Reviewed   COMPREHENSIVE METABOLIC PANEL - Abnormal; Notable for the following components:       Result Value    Sodium 123 (*)     Chloride 85 (*)     Glucose 157 (*)     All other components within normal limits   CBC WITH AUTO DIFFERENTIAL - Abnormal; Notable for the following components:    RBC 4.00 (*)     Hemoglobin 13.1 (*)     Hematocrit 37.0 (*)     MCH 32.8 (*)     Segs Relative 75.1 (*)     Lymphocytes % 17.2 (*)     Monocytes % 6.9 (*)     Immature Neutrophil % 0.5 (*)     All other components within normal limits   ETHANOL - Abnormal; Notable for the following components:    Alcohol Scrn 0.05 (*)     All other components within normal limits   POCT GLUCOSE - Abnormal; Notable for the following components:    POC Glucose 155 (*)     All other components within normal limits   TROPONIN   URINALYSIS   PROTIME-INR   SODIUM, URINE, RANDOM   CREATININE, RANDOM URINE   OSMOLALITY, URINE       All other labs were within normal range or not returned as of this dictation.     EMERGENCY DEPARTMENT COURSE and DIFFERENTIAL DIAGNOSIS/MDM:   Vitals:    Vitals:    01/12/23 2244 01/12/23 2306 01/13/23 0003 01/13/23 0019   BP: (!) 142/74 118/69 (!) 111/56    Pulse: 77 71 67 67   Resp: 16 16 14 13   Temp: 98.2 °F (36.8 °C)      TempSrc: Infrared      SpO2: 98% 97% 97% 97%   Weight: 211 lb (95.7 kg)      Height: 6' 4\" (1.93 m)              MDM  Number of Diagnoses or Management Options  Hyponatremia  Intraparenchymal hemorrhage of brain (HCC)  Subdural hematoma  Syncope, unspecified syncope type  Diagnosis management comments: 49-year-old male with history of CAD, non-insulin-dependent diabetes, hypertension presents after syncopal episode at home. Episode was partially witnessed by his wife who is also the emergency department and who provides collateral history. She endorses that he has remote history of passing out that was due to low blood sugar. She reports that she was in another room when she heard him fall. He reportedly had been in another room watching television. She believes he was getting up to go to the kitchen or to put a glass away. She reports finding him unconscious. He did regain consciousness after few minutes. She reports that his blood sugar was in the 130s. She initially had trouble getting a blood pressure reading on him. He states that he does not remember a lot of the episode. He does present with a GCS of 15 to the emergency department with no acute pain complaints. He denies any chest pain or palpitations. No abdominal pain. Denies any typical exposures today. Did take his routine blood pressure medicines and other medications at around 7 PM.  He reports that he is getting over the COVID-19 infection. He is finishing a course of steroids and antibiotics. He tested negative for Covid-19 today at home. He reports drinking 5 to 6 beers a day. He is not anticoagulated. He presents with unremarkable vital signs. Blood pressure is within normal limits. He is afebrile. No tachycardia. Respirations are within normal limits. His ox saturations in the high 90s on room air. Physical neurological exams are grossly nonfocal.    EKG is interpreted by me and is nonacute. EKG does not not show an acute signs of ischemia arrhythmia. Labs including troponin are obtained. Chest x-ray is obtained. Given that he has had, and obtaining CT head as well as CT cervical spine    Labs did show some electrolyte abnormalities. His sodium is low at 123. His chloride is also low at 85. Do not have prior values on my system available.     Reviewed care everywhere records and did review some cardiology records as well as other records from other systems. His head CT scan is remarkable for an intraparenchymal hemorrhage as well as a small amount of subdural hemorrhage. Results are discussed with patient. He requires admission for treatment of his hyponatremia as well as for further evaluation of his head bleeds. I am adding on urine studies including urine sodium, urine creatinine and urine osmolality. I did speak with his wife and she did report when he found him that his arms seem to be postured. Suspicious that he might of potentially had a seizure due to the hyponatremia. Hyponatremia this point is of unclear etiology. He does drink daily. It is possible that the hyponatremia is related to alcohol use. He does not appear to be on any diuretic medications. He denies significantly poor po intake over the past few days. Has had no further syncopal or convulsive episodes in the emergency department. I am starting IV fluids at a low rate for the hyponatremia. Family would like him transferred to [facility].  I did consult [facility].  He will be transferred there for evaluation and further management. -  Patient seen and evaluated in the emergency department. -  Triage and nursing notes reviewed and incorporated. -  Old chart records reviewed and incorporated. -  Work-up included:  See above  -  Results discussed with patient. REASSESSMENT          CRITICAL CARE TIME     Total critical care time today provided was 35 minutes. This excludes seperately billable procedure. Critical care time provided for hyponatremia, intraparynchmal hemorrhage, subdural hematoma that required close evaluation and/or intervention with concern for patient decompensation. This excludes seperately billable procedures and family discussion time.  Critical care time provided for obtaining history, conducting a physical exam, performing and monitoring interventions, ordering, collecting and interpreting tests, and establishing medical decision-making. There was a potential for life/limb threatening pathology requiring close evaluation and intervention with concern for patient decompensation. CONSULTS:  None    PROCEDURES:  None performed unless otherwise noted below     Procedures        FINAL IMPRESSION      1. Syncope, unspecified syncope type    2. Hyponatremia    3. Subdural hematoma    4. Intraparenchymal hemorrhage of brain [facility])          DISPOSITION/PLAN   DISPOSITION Decision To Transfer 01/13/2023 12:19:53 AM      PATIENT REFERRED TO:  No follow-up provider specified. DISCHARGE MEDICATIONS:  New Prescriptions    No medications on file       ED Provider Disposition Time  DISPOSITION Decision To Transfer 01/13/2023 12:19:53 AM      Appropriate personal protective equipment was worn during the patient's evaluation. These included surgical, eye protection, surgical mask or in 95 respirator and gloves. The patient was also placed in a surgical mask for the prevention of possible spread of respiratory viral illnesses. The Patient was instructed to read the package inserts with any medication that was prescribed. Major potential reactions and medication interactions were discussed. The Patient understands that there are numerous possible adverse reactions not covered. The patient was also instructed to arrange follow-up with his or her primary care provider for review of any pending labwork or incidental findings on any radiology results that were obtained. All efforts were made to discuss any incidental findings that require further monitoring. Controlled Substances Monitoring:     No flowsheet data found.     (Please note that portions of this note were completed with a voice recognition program.  Efforts were made to edit the dictations but occasionally words are mis-transcribed.)    Lissette Hernández MD (electronically signed)  Attending Emergency Physician            Keyla Santoro MD  01/13/23 6534

## 2024-04-04 ENCOUNTER — OFFICE (OUTPATIENT)
Dept: URBAN - METROPOLITAN AREA CLINIC 18 | Facility: CLINIC | Age: 69
End: 2024-04-04

## 2024-04-04 VITALS
SYSTOLIC BLOOD PRESSURE: 130 MMHG | OXYGEN SATURATION: 97 % | HEIGHT: 76 IN | DIASTOLIC BLOOD PRESSURE: 72 MMHG | WEIGHT: 210 LBS | HEART RATE: 62 BPM

## 2024-04-04 DIAGNOSIS — R19.7 DIARRHEA, UNSPECIFIED: ICD-10-CM

## 2024-04-04 DIAGNOSIS — R13.10 DYSPHAGIA, UNSPECIFIED: ICD-10-CM

## 2024-04-04 DIAGNOSIS — Z78.9 OTHER SPECIFIED HEALTH STATUS: ICD-10-CM

## 2024-04-04 DIAGNOSIS — Z86.010 PERSONAL HISTORY OF COLONIC POLYPS: ICD-10-CM

## 2024-04-04 PROCEDURE — 99204 OFFICE O/P NEW MOD 45 MIN: CPT | Performed by: INTERNAL MEDICINE

## 2024-04-11 ENCOUNTER — AMBULATORY SURGICAL CENTER (OUTPATIENT)
Dept: URBAN - METROPOLITAN AREA SURGERY 7 | Facility: SURGERY | Age: 69
End: 2024-04-11

## 2024-04-11 VITALS
RESPIRATION RATE: 28 BRPM | DIASTOLIC BLOOD PRESSURE: 58 MMHG | OXYGEN SATURATION: 99 % | OXYGEN SATURATION: 92 % | OXYGEN SATURATION: 99 % | RESPIRATION RATE: 28 BRPM | HEART RATE: 56 BPM | SYSTOLIC BLOOD PRESSURE: 130 MMHG | RESPIRATION RATE: 16 BRPM | OXYGEN SATURATION: 98 % | SYSTOLIC BLOOD PRESSURE: 151 MMHG | HEART RATE: 53 BPM | HEART RATE: 52 BPM | SYSTOLIC BLOOD PRESSURE: 135 MMHG | RESPIRATION RATE: 15 BRPM | HEART RATE: 57 BPM | HEART RATE: 58 BPM | HEART RATE: 58 BPM | OXYGEN SATURATION: 95 % | HEART RATE: 52 BPM | RESPIRATION RATE: 15 BRPM | DIASTOLIC BLOOD PRESSURE: 58 MMHG | SYSTOLIC BLOOD PRESSURE: 132 MMHG | HEIGHT: 76 IN | DIASTOLIC BLOOD PRESSURE: 80 MMHG | SYSTOLIC BLOOD PRESSURE: 132 MMHG | DIASTOLIC BLOOD PRESSURE: 72 MMHG | WEIGHT: 210 LBS | SYSTOLIC BLOOD PRESSURE: 142 MMHG | DIASTOLIC BLOOD PRESSURE: 91 MMHG | HEIGHT: 76 IN | SYSTOLIC BLOOD PRESSURE: 117 MMHG | SYSTOLIC BLOOD PRESSURE: 130 MMHG | SYSTOLIC BLOOD PRESSURE: 117 MMHG | WEIGHT: 210 LBS | TEMPERATURE: 98 F | HEART RATE: 57 BPM | DIASTOLIC BLOOD PRESSURE: 91 MMHG | DIASTOLIC BLOOD PRESSURE: 49 MMHG | DIASTOLIC BLOOD PRESSURE: 80 MMHG | HEART RATE: 50 BPM | OXYGEN SATURATION: 92 % | RESPIRATION RATE: 13 BRPM | RESPIRATION RATE: 16 BRPM | OXYGEN SATURATION: 95 % | DIASTOLIC BLOOD PRESSURE: 72 MMHG | DIASTOLIC BLOOD PRESSURE: 70 MMHG | RESPIRATION RATE: 13 BRPM | OXYGEN SATURATION: 98 % | SYSTOLIC BLOOD PRESSURE: 142 MMHG | TEMPERATURE: 98 F | DIASTOLIC BLOOD PRESSURE: 70 MMHG | HEART RATE: 50 BPM | HEART RATE: 56 BPM | SYSTOLIC BLOOD PRESSURE: 135 MMHG | HEART RATE: 53 BPM | DIASTOLIC BLOOD PRESSURE: 49 MMHG | DIASTOLIC BLOOD PRESSURE: 74 MMHG | SYSTOLIC BLOOD PRESSURE: 151 MMHG | DIASTOLIC BLOOD PRESSURE: 74 MMHG

## 2024-04-11 DIAGNOSIS — R13.10 DYSPHAGIA, UNSPECIFIED: ICD-10-CM

## 2024-04-11 DIAGNOSIS — K22.2 ESOPHAGEAL OBSTRUCTION: ICD-10-CM

## 2024-04-11 PROCEDURE — 43248 EGD GUIDE WIRE INSERTION: CPT | Performed by: INTERNAL MEDICINE

## 2024-12-09 PROBLEM — K22.2 ESOPHAGEAL OBSTRUCTION: Status: ACTIVE | Noted: 2024-04-11

## 2025-05-12 ENCOUNTER — AMBULATORY SURGICAL CENTER (OUTPATIENT)
Dept: URBAN - METROPOLITAN AREA SURGERY 7 | Facility: SURGERY | Age: 70
End: 2025-05-12
Payer: MEDICARE

## 2025-05-12 VITALS
RESPIRATION RATE: 11 BRPM | SYSTOLIC BLOOD PRESSURE: 141 MMHG | OXYGEN SATURATION: 99 % | DIASTOLIC BLOOD PRESSURE: 66 MMHG | DIASTOLIC BLOOD PRESSURE: 68 MMHG | HEART RATE: 55 BPM | RESPIRATION RATE: 18 BRPM | HEART RATE: 49 BPM | DIASTOLIC BLOOD PRESSURE: 60 MMHG | OXYGEN SATURATION: 98 % | SYSTOLIC BLOOD PRESSURE: 117 MMHG | RESPIRATION RATE: 14 BRPM | SYSTOLIC BLOOD PRESSURE: 113 MMHG | TEMPERATURE: 97.3 F | DIASTOLIC BLOOD PRESSURE: 71 MMHG | DIASTOLIC BLOOD PRESSURE: 69 MMHG | RESPIRATION RATE: 19 BRPM | HEART RATE: 52 BPM | WEIGHT: 200 LBS | SYSTOLIC BLOOD PRESSURE: 137 MMHG | SYSTOLIC BLOOD PRESSURE: 158 MMHG | HEART RATE: 57 BPM | RESPIRATION RATE: 16 BRPM | DIASTOLIC BLOOD PRESSURE: 75 MMHG | DIASTOLIC BLOOD PRESSURE: 65 MMHG | HEART RATE: 58 BPM | DIASTOLIC BLOOD PRESSURE: 571 MMHG | OXYGEN SATURATION: 97 % | SYSTOLIC BLOOD PRESSURE: 131 MMHG | SYSTOLIC BLOOD PRESSURE: 118 MMHG | HEIGHT: 76 IN | OXYGEN SATURATION: 100 % | OXYGEN SATURATION: 96 %

## 2025-05-12 DIAGNOSIS — K57.30 DIVERTICULOSIS OF LARGE INTESTINE WITHOUT PERFORATION OR ABS: ICD-10-CM

## 2025-05-12 DIAGNOSIS — R19.7 DIARRHEA, UNSPECIFIED: ICD-10-CM

## 2025-05-12 DIAGNOSIS — K52.831 COLLAGENOUS COLITIS: ICD-10-CM

## 2025-05-12 PROCEDURE — 45380 COLONOSCOPY AND BIOPSY: CPT | Performed by: INTERNAL MEDICINE
